# Patient Record
Sex: FEMALE | Race: WHITE | URBAN - METROPOLITAN AREA
[De-identification: names, ages, dates, MRNs, and addresses within clinical notes are randomized per-mention and may not be internally consistent; named-entity substitution may affect disease eponyms.]

---

## 2023-11-21 ENCOUNTER — OFFICE VISIT (OUTPATIENT)
Dept: ORTHOPEDIC SURGERY | Age: 23
End: 2023-11-21

## 2023-11-21 DIAGNOSIS — M54.16 LUMBAR RADICULOPATHY: ICD-10-CM

## 2023-11-21 DIAGNOSIS — M25.562 ACUTE PAIN OF LEFT KNEE: Primary | ICD-10-CM

## 2023-11-21 RX ORDER — METHYLPREDNISOLONE 4 MG/1
TABLET ORAL
Qty: 1 KIT | Refills: 0 | Status: SHIPPED | OUTPATIENT
Start: 2023-11-21

## 2023-11-21 NOTE — PROGRESS NOTES
Name: Nikolai Gomez  YOB: 2000  Gender: female  MRN: [id number]      CC: Knee Pain (L)       HPI: Nikolai Gomez is a 26 y.o. female who presents with Knee Pain (L)  Lydia Owens is a new patient who is here today for pain in her L knee. She reports she has had knee pain for the last month, following an episode where she was bending down and felt a pop in her knee. .  She does have a history of chronic knee pain. She also has a history of back pain. Her other complaint today is that she has shooting pain and numbness down the outside of her left leg down to the outside of her left ankle. She has not had significant treatment to this point. ROS/Meds/PSH/PMH/FH/SH: I personally reviewed the patients standard intake form. Below are the pertinents    Tobacco:  reports that she has never smoked. She has never used smokeless tobacco.  Diabetes: none  Other: none    Physical Examination:  General: no acute distress  Lungs: breathing easily  CV: regular rhythm by pulse  Left Knee: No effusion. Tenderness to palpation over the medial joint line. Pain at the extremes of motion of the medial joint line. Positive bounce home. Pain with Bart's with recreation of symptoms. Ligamentously stable x 4. She does have some vague nonmidline tenderness palpation of lumbar spine with full truncal flexion and extension without provocative maneuvers. However she does have a positive straight leg raise with recreation of shooting pain and some tingling sensation down the outside of her left leg and the lateral side of her ankle. Imaging:   Knee XR: 3 views     Clinical Indication  1. Acute pain of left knee    2. Lumbar radiculopathy           Report: AP, lateral, sunrise views of the Left knee demonstrates no acute fracture dislocation, or advanced degenerative changes    Impression: No acute findings as above           All imaging interpreted by myself Jay Skinner MD independent of radiology

## 2023-12-11 ENCOUNTER — OFFICE VISIT (OUTPATIENT)
Dept: ORTHOPEDIC SURGERY | Age: 23
End: 2023-12-11
Payer: COMMERCIAL

## 2023-12-11 DIAGNOSIS — M22.2X2 PATELLOFEMORAL SYNDROME OF LEFT KNEE: Primary | ICD-10-CM

## 2023-12-11 DIAGNOSIS — M25.562 ACUTE PAIN OF LEFT KNEE: ICD-10-CM

## 2023-12-11 PROCEDURE — 99214 OFFICE O/P EST MOD 30 MIN: CPT | Performed by: ORTHOPAEDIC SURGERY

## 2023-12-11 NOTE — PROGRESS NOTES
Name: Brendan Bui  YOB: 2000  Gender: female  MRN: [id number]      CC: Knee Pain (L)       HPI: Brendan Bui is a 26 y.o. female who returns for follow up and MRI results on her left knee. She continues to have pain but this is less severe than before. Physical Examination:  General: no acute distress  Lungs: breathing easily  CV: regular rhythm by pulse  Left Knee: Vague tenderness palpation peripatellar region medial and lateral mild crepitus with full passive and active range of motion. Imaging:   Reviewed the MRI scan which demonstrates a little bit of increased lateral patellar tilt and some hypertrophic tissue but no full-thickness chondromalacia or other internal derangement  All imaging interpreted by myself Jay Alonzo MD independent of radiology review    Assessment:     ICD-10-CM    1. Patellofemoral syndrome of left knee  M22.2X2 Amb External Referral To Physical Therapy      2. Acute pain of left knee  M25.562            Plan:   I think majority of her symptoms are patellofemoral syndrome. We discussed conservative treatment would be the mainstay of treatment. We discussed anti-inflammatories. Voltaren was prescribed today. We also discussed physical therapy which was prescribed as well. If she is not improving neck step would be to consider an intra-articular injection. She can follow-up as needed. Radha Alonzo MD, [id number] [facility]